# Patient Record
Sex: MALE | Race: WHITE | NOT HISPANIC OR LATINO | Employment: OTHER | URBAN - METROPOLITAN AREA
[De-identification: names, ages, dates, MRNs, and addresses within clinical notes are randomized per-mention and may not be internally consistent; named-entity substitution may affect disease eponyms.]

---

## 2017-02-20 ENCOUNTER — APPOINTMENT (EMERGENCY)
Dept: RADIOLOGY | Facility: HOSPITAL | Age: 57
End: 2017-02-20
Payer: OTHER GOVERNMENT

## 2017-02-20 ENCOUNTER — HOSPITAL ENCOUNTER (EMERGENCY)
Facility: HOSPITAL | Age: 57
Discharge: HOME | End: 2017-02-20
Attending: EMERGENCY MEDICINE | Admitting: EMERGENCY MEDICINE
Payer: OTHER GOVERNMENT

## 2017-02-20 VITALS
HEIGHT: 70 IN | OXYGEN SATURATION: 99 % | SYSTOLIC BLOOD PRESSURE: 161 MMHG | BODY MASS INDEX: 23.48 KG/M2 | RESPIRATION RATE: 18 BRPM | HEART RATE: 57 BPM | WEIGHT: 164 LBS | TEMPERATURE: 97.8 F | DIASTOLIC BLOOD PRESSURE: 89 MMHG

## 2017-02-20 DIAGNOSIS — L03.116 CELLULITIS OF LEFT LEG WITHOUT FOOT: Primary | ICD-10-CM

## 2017-02-20 PROCEDURE — 99283 EMERGENCY DEPT VISIT LOW MDM: CPT

## 2017-02-20 PROCEDURE — 73630 X-RAY EXAM OF FOOT: CPT

## 2017-02-20 PROCEDURE — 73610 X-RAY EXAM OF ANKLE: CPT

## 2017-02-20 RX ORDER — CEPHALEXIN 500 MG/1
500 CAPSULE ORAL 2 TIMES DAILY
Qty: 14 CAPSULE | Refills: 0 | Status: SHIPPED | OUTPATIENT
Start: 2017-02-20 | End: 2017-02-27

## 2017-02-20 RX ORDER — TRAZODONE HYDROCHLORIDE 50 MG/1
50 TABLET ORAL
COMMUNITY
End: 2020-06-05 | Stop reason: ALTCHOICE

## 2017-02-20 RX ORDER — TAMSULOSIN HYDROCHLORIDE 0.4 MG/1
0.4 CAPSULE ORAL
COMMUNITY
End: 2020-08-19 | Stop reason: SDUPTHER

## 2017-02-20 RX ORDER — CEPHALEXIN 250 MG/1
500 CAPSULE ORAL ONCE
Status: COMPLETED | OUTPATIENT
Start: 2017-02-20 | End: 2017-02-20

## 2017-02-20 RX ORDER — AMMONIUM LACTATE 12 G/100G
CREAM TOPICAL AS NEEDED
COMMUNITY

## 2017-02-20 RX ORDER — HYDROXYZINE HYDROCHLORIDE 10 MG/1
30 TABLET, FILM COATED ORAL DAILY PRN
COMMUNITY

## 2017-02-20 RX ORDER — DIPHENOXYLATE HYDROCHLORIDE AND ATROPINE SULFATE 2.5; .025 MG/1; MG/1
1 TABLET ORAL DAILY
COMMUNITY

## 2017-02-20 RX ORDER — LEVOTHYROXINE SODIUM 0.12 MG/1
112 TABLET ORAL DAILY
COMMUNITY

## 2017-02-20 RX ADMIN — CEPHALEXIN 500 MG: 250 CAPSULE ORAL at 13:01

## 2017-08-04 ENCOUNTER — HOSPITAL ENCOUNTER (EMERGENCY)
Facility: HOSPITAL | Age: 57
Discharge: HOME | End: 2017-08-04

## 2017-08-04 VITALS
RESPIRATION RATE: 18 BRPM | WEIGHT: 158 LBS | OXYGEN SATURATION: 95 % | BODY MASS INDEX: 22.62 KG/M2 | HEIGHT: 70 IN | TEMPERATURE: 97.3 F | DIASTOLIC BLOOD PRESSURE: 94 MMHG | SYSTOLIC BLOOD PRESSURE: 158 MMHG | HEART RATE: 52 BPM

## 2017-08-04 DIAGNOSIS — S61.211A LACERATION OF LEFT INDEX FINGER: Primary | ICD-10-CM

## 2017-08-04 PROCEDURE — 99282 EMERGENCY DEPT VISIT SF MDM: CPT

## 2017-08-04 RX ORDER — CEPHALEXIN 500 MG/1
500 CAPSULE ORAL 4 TIMES DAILY
Qty: 20 CAPSULE | Refills: 0 | Status: SHIPPED | OUTPATIENT
Start: 2017-08-04 | End: 2017-08-09

## 2017-08-04 RX ORDER — GINSENG 100 MG
1 CAPSULE ORAL ONCE
Status: COMPLETED | OUTPATIENT
Start: 2017-08-04 | End: 2017-08-04

## 2017-08-04 RX ADMIN — BACITRACIN 1 SMALL APPLICATION: 500 OINTMENT TOPICAL at 11:18

## 2019-12-30 ENCOUNTER — OFFICE VISIT (OUTPATIENT)
Dept: UROLOGY | Facility: HOSPITAL | Age: 59
End: 2019-12-30
Payer: MEDICARE

## 2019-12-30 ENCOUNTER — TELEPHONE (OUTPATIENT)
Dept: UROLOGY | Facility: HOSPITAL | Age: 59
End: 2019-12-30

## 2019-12-30 VITALS — HEIGHT: 70 IN | BODY MASS INDEX: 23.48 KG/M2 | WEIGHT: 164 LBS

## 2019-12-30 DIAGNOSIS — R39.198 DIFFICULTY URINATING: Primary | ICD-10-CM

## 2019-12-30 LAB — POST-VOID RESIDUAL VOLUME, ML POC: 6 ML

## 2019-12-30 PROCEDURE — 51798 US URINE CAPACITY MEASURE: CPT | Performed by: UROLOGY

## 2019-12-30 PROCEDURE — 99204 OFFICE O/P NEW MOD 45 MIN: CPT | Performed by: UROLOGY

## 2019-12-30 RX ORDER — VARENICLINE TARTRATE 1 MG/1
1 TABLET, FILM COATED ORAL 2 TIMES DAILY
COMMUNITY

## 2019-12-30 RX ORDER — CHLORAL HYDRATE 500 MG
1000 CAPSULE ORAL DAILY
COMMUNITY

## 2019-12-30 NOTE — PROGRESS NOTES
Assessment/Plan:    Difficulty urinating  It is difficult to discern whether the patient has an overactive bladder and is having difficulty urinating as his bladder is not very full or if he actually has an atonic bladder  We will proceed with urodynamics and cystoscopy for further evaluation  He is to continue on the tamsulosin and we will hold off on any further treatments until his workup is complete  Diagnoses and all orders for this visit:    Difficulty urinating  -     POCT Measure PVR    Other orders  -     Omega-3 Fatty Acids (FISH OIL) 1,000 mg; Take 1,000 mg by mouth daily  -     varenicline (CHANTIX) 1 mg tablet; Take 1 mg by mouth 2 (two) times a day          Total visit time was 60 minutes of which over 50% was spent on counseling  Subjective:     Patient ID: Matheus Collins is a 60 y o  male    50-year-old male with a history of traumatic brain injury presents from his facility with complaints of difficulty urinating  The patient's main complaint is that he has to sit to urinate and push on his abdomen with his hands to empty his bladder  He is a poor historian and is unable to exactly tell me how many times he urinates during the day  He denies any urinary urgency or incontinence  His lower urinary tract symptoms are detailed below  He states that he has to strain so hard to urinate that sometimes he defecates as well  He also has nocturia but states he urinates better at night  He is on tamsulosin and has been on this medication for a while  He has no other complaints  The following portions of the patient's history were reviewed and updated as appropriate: allergies, current medications, past family history, past medical history, past social history, past surgical history and problem list     Review of Systems   Constitutional: Negative  HENT: Negative  Eyes: Negative  Respiratory: Negative  Cardiovascular: Negative  Gastrointestinal: Negative  Endocrine: Negative  Genitourinary:        As noted per HPI   Musculoskeletal: Negative  Skin: Negative  Allergic/Immunologic: Negative  Neurological: Negative  Hematological: Negative  Psychiatric/Behavioral: Negative  AUA SYMPTOM SCORE      Most Recent Value   AUA SYMPTOM SCORE   How often have you had a sensation of not emptying your bladder completely after you finished urinating? 4   How often have you had to urinate again less than two hours after you finished urinating? 4   How often have you found you stopped and started again several times when you urinate?  0   How often have you found it difficult to postpone urination? 0   How often have you had a weak urinary stream?  5   How often have you had to push or strain to begin urination? 5   How many times did you most typically get up to urinate from the time you went to bed at night until the time you got up in the morning? 4   Quality of Life: If you were to spend the rest of your life with your urinary condition just the way it is now, how would you feel about that?  6   AUA SYMPTOM SCORE  22              Objective:    Physical Exam   Constitutional: He is oriented to person, place, and time  He appears well-developed and well-nourished  Neck: Normal range of motion  Cardiovascular: Intact distal pulses  Pulmonary/Chest: Effort normal    Abdominal: Soft  Bowel sounds are normal  He exhibits no distension and no mass  There is no tenderness  There is no rebound and no guarding  Genitourinary: Rectum normal and penis normal    Genitourinary Comments: Prostate 40 g, smooth, no nodules, nontender  Patient did have a little bit of urination during prostate exam    Musculoskeletal: Normal range of motion  Neurological: He is alert and oriented to person, place, and time  Skin: Skin is warm and dry  Psychiatric: He has a normal mood and affect  Vitals reviewed          Results  No results found for: PSA  No results found for: GLUCOSE, CALCIUM, NA, K, CO2, CL, BUN, CREATININE  No results found for: WBC, HGB, HCT, MCV, PLT    Recent Results (from the past 1 hour(s))   POCT Measure PVR    Collection Time: 12/30/19  3:42 PM   Result Value Ref Range    POST-VOID RESIDUAL VOLUME, ML POC 6 mL   ]

## 2019-12-30 NOTE — TELEPHONE ENCOUNTER
PT WAS SEEN IN THE OFFICE TODAY, PER DR JONES PT NEEDS AN APPT FOR A CYSTO/URODYNAMIC APPT SCHED IN [facility]

## 2019-12-30 NOTE — ASSESSMENT & PLAN NOTE
It is difficult to discern whether the patient has an overactive bladder and is having difficulty urinating as his bladder is not very full or if he actually has an atonic bladder  We will proceed with urodynamics and cystoscopy for further evaluation  He is to continue on the tamsulosin and we will hold off on any further treatments until his workup is complete

## 2020-01-02 NOTE — TELEPHONE ENCOUNTER
Patient with h/o Traumatic brain injury with complaints of difficulty urinating, straining, and nocturia  Ordered to have urodynamics and cystoscopy  He currently resides in rehab facility per chart  Will attempt to call and schedule urodynamics tomorrow

## 2020-01-03 NOTE — TELEPHONE ENCOUNTER
Called and spoke with patient's  Agustina Pal  He reports patient does consent for himself for invasive procedures  He also reports patient ambulatory and able to follow commands appropriately  Reviewed the need to schedule urodynamic testing, offered in the [place] office  Offered 1/8/19 and Agustina Pal deferred as there are conflicting appts at his facility  Next offered 2/12/19 for urodynamics at 10 am   This appt accepted and details confirmed  Encouraged the patient to present to the office with a comfortably full bladder  Next scheduled cysto FU and review of UDS with Dr Arthur Menon in the [facility] office on 2/28/19 at 10 am   Agustina Pal accepted and confirmed these appt details as well

## 2020-02-12 ENCOUNTER — PROCEDURE VISIT (OUTPATIENT)
Dept: UROLOGY | Facility: AMBULATORY SURGERY CENTER | Age: 60
End: 2020-02-12
Payer: MEDICARE

## 2020-02-12 DIAGNOSIS — N32.81 DETRUSOR INSTABILITY: ICD-10-CM

## 2020-02-12 DIAGNOSIS — N39.41 URGE INCONTINENCE OF URINE: Primary | ICD-10-CM

## 2020-02-12 DIAGNOSIS — R39.11 HESITANCY: ICD-10-CM

## 2020-02-12 LAB
SL AMB  POCT GLUCOSE, UA: NEGATIVE
SL AMB LEUKOCYTE ESTERASE,UA: NEGATIVE
SL AMB POCT BILIRUBIN,UA: NEGATIVE
SL AMB POCT BLOOD,UA: NEGATIVE
SL AMB POCT CLARITY,UA: CLEAR
SL AMB POCT COLOR,UA: YELLOW
SL AMB POCT KETONES,UA: NEGATIVE
SL AMB POCT NITRITE,UA: NEGATIVE
SL AMB POCT PH,UA: 5
SL AMB POCT SPECIFIC GRAVITY,UA: 1.02
SL AMB POCT URINE PROTEIN: NEGATIVE
SL AMB POCT UROBILINOGEN: NORMAL

## 2020-02-12 PROCEDURE — 81002 URINALYSIS NONAUTO W/O SCOPE: CPT

## 2020-02-12 PROCEDURE — 51728 CYSTOMETROGRAM W/VP: CPT

## 2020-02-12 PROCEDURE — 51784 ANAL/URINARY MUSCLE STUDY: CPT

## 2020-02-12 PROCEDURE — 51797 INTRAABDOMINAL PRESSURE TEST: CPT

## 2020-02-12 NOTE — PROGRESS NOTES
CC: "trouble emptying"    Unable to void 110 ml in bladder    CMG:       Position:      Standing       Fill sensation   21 ml,  pdet 0 cm of H2O       First urge:          201 ml,     pdet 3 cm of H2O          Normal urge:     219 ml,     pdet -4 cm of H2O       Must urge:       Not verbalized        Capacity:          287  ml,     pdet 43 cm of H2O          Max pdet during void    47 cm H2O       Voided volume:    102 6 ml       Bladder stability:    unstable at  287ml with  leakage       Compliance:  normal        EMG activity:       Normal during filling,        normal during voiding    Comments:   Sensory urgency   Instructed to void at normal urge (219 ml) and voided 115 ml with pdet of 54 cm of H2O   Fill then restarted and at 287 ml had DI, leaked and voided with max pdet of 47 cm of H2O   Voided with slow flow   Calculated residual 169 ml    Urodynamics  Date/Time: 2/12/2020 4:59 PM  Performed by: Kalen Esquivel RN  Authorized by: Jo Ann Wright MD   Procedure - Urodynamics:  Procedure details: CMG      Voiding Pressure Study: Yes    Intra-abdominal Voiding Pressure Study: Yes    Post-procedure:     Patient tolerance: Patient tolerated procedure well with no immediate complications

## 2020-02-28 ENCOUNTER — PROCEDURE VISIT (OUTPATIENT)
Dept: UROLOGY | Facility: HOSPITAL | Age: 60
End: 2020-02-28
Payer: MEDICARE

## 2020-02-28 VITALS
HEART RATE: 64 BPM | SYSTOLIC BLOOD PRESSURE: 140 MMHG | HEIGHT: 70 IN | WEIGHT: 161.8 LBS | BODY MASS INDEX: 23.16 KG/M2 | DIASTOLIC BLOOD PRESSURE: 90 MMHG

## 2020-02-28 DIAGNOSIS — N39.41 URGE INCONTINENCE: Primary | ICD-10-CM

## 2020-02-28 PROCEDURE — 99213 OFFICE O/P EST LOW 20 MIN: CPT | Performed by: UROLOGY

## 2020-02-28 PROCEDURE — 52000 CYSTOURETHROSCOPY: CPT | Performed by: UROLOGY

## 2020-02-28 RX ORDER — OXYBUTYNIN CHLORIDE 10 MG/1
10 TABLET, EXTENDED RELEASE ORAL DAILY
Qty: 30 TABLET | Refills: 1 | Status: SHIPPED | OUTPATIENT
Start: 2020-02-28 | End: 2020-05-13 | Stop reason: SDUPTHER

## 2020-02-28 NOTE — PROGRESS NOTES
Cystoscopy  Date/Time: 2/28/2020 10:42 AM  Performed by: Saúl Serrano MD  Authorized by: Saúl Serrano MD     Procedure details: cystoscopy          Written Consent Obtained      Indications for Procedure:   refractory lower urinary tract symptoms     Physical Exam     Constitutional   General appearance: No acute distress, well appearing and well nourished  Pulmonary   Respiratory effort: No increased work of breathing or signs of respiratory distress  Cardiovascular   Examination of extremities for edema and/or varicosities: Normal     Abdomen   Abdomen: Non-tender, no masses  Liver and spleen: No hepatomegaly or splenomegaly  Genitourinary   Normal phallus and meatus   Musculoskeletal   Gait and station: Normal     Skin   Skin and subcutaneous tissue: Normal without rashes or lesions  Lymphatic   Palpation of lymph nodes in groin: No lymphadenopathy  Additional Exam: Neuro exam nonfocal   The flexible cystoscope was introduced into the urethra and advanced into the bladder  URETHRA:  Normal,  without strictures or lesions  PROSTATE:  Moderate bilobar obstruction  TRIGONE & UOs:   Normal anatomy with efflux of clear urine  No mucosal lesions or subtrigonal masses  BLADDER MUCOSA:  Normal, without neoplasms or other lesions  DETRUSOR: Normal capacity, without flaccidity, without excessive compliance, without trabeculation or diverticula, without uninhibited bladder contractions on fillings  RETROFLEXED SCOPE VIEW: Normal bladder neck    Plan:  I reviewed his urodynamics and cystoscopy results with him  It seems like he has an overactive bladder and likely has difficulty urinating as his bladder is not very full when he has the urge to urinate  He is to continue on tamsulosin and we will add oxybutynin  Side effects were discussed    He will follow up in 6 weeks to re-evaluate his symptoms and check a postvoid residual   In addition to the cystoscopy today I spent over 15 minutes discussing his urodynamics results and treatment options

## 2020-04-24 ENCOUNTER — TELEPHONE (OUTPATIENT)
Dept: UROLOGY | Facility: MEDICAL CENTER | Age: 60
End: 2020-04-24

## 2020-04-29 ENCOUNTER — TELEPHONE (OUTPATIENT)
Dept: UROLOGY | Facility: AMBULATORY SURGERY CENTER | Age: 60
End: 2020-04-29

## 2020-05-13 ENCOUNTER — TELEPHONE (OUTPATIENT)
Dept: UROLOGY | Facility: AMBULATORY SURGERY CENTER | Age: 60
End: 2020-05-13

## 2020-05-13 ENCOUNTER — TELEMEDICINE (OUTPATIENT)
Dept: UROLOGY | Facility: AMBULATORY SURGERY CENTER | Age: 60
End: 2020-05-13
Payer: MEDICARE

## 2020-05-13 DIAGNOSIS — Z12.5 SCREENING FOR PROSTATE CANCER: ICD-10-CM

## 2020-05-13 DIAGNOSIS — N39.41 URGE INCONTINENCE: ICD-10-CM

## 2020-05-13 DIAGNOSIS — R39.198 DIFFICULTY URINATING: Primary | ICD-10-CM

## 2020-05-13 DIAGNOSIS — N40.1 BENIGN PROSTATIC HYPERPLASIA WITH LOWER URINARY TRACT SYMPTOMS, SYMPTOM DETAILS UNSPECIFIED: ICD-10-CM

## 2020-05-13 PROCEDURE — 99442 PR PHYS/QHP TELEPHONE EVALUATION 11-20 MIN: CPT | Performed by: NURSE PRACTITIONER

## 2020-05-13 RX ORDER — OXYBUTYNIN CHLORIDE 15 MG/1
15 TABLET, EXTENDED RELEASE ORAL DAILY
Qty: 30 TABLET | Refills: 4 | Status: SHIPPED | OUTPATIENT
Start: 2020-05-13 | End: 2020-08-19 | Stop reason: ALTCHOICE

## 2020-05-13 RX ORDER — OXYBUTYNIN CHLORIDE 15 MG/1
15 TABLET, EXTENDED RELEASE ORAL DAILY
Qty: 30 TABLET | Refills: 4 | Status: SHIPPED | OUTPATIENT
Start: 2020-05-13 | End: 2020-05-13 | Stop reason: SDUPTHER

## 2020-06-02 ENCOUNTER — TELEPHONE (OUTPATIENT)
Dept: UROLOGY | Facility: MEDICAL CENTER | Age: 60
End: 2020-06-02

## 2020-06-05 ENCOUNTER — HOSPITAL ENCOUNTER (EMERGENCY)
Facility: HOSPITAL | Age: 60
Discharge: HOME | End: 2020-06-05
Attending: EMERGENCY MEDICINE
Payer: COMMERCIAL

## 2020-06-05 VITALS
BODY MASS INDEX: 23.1 KG/M2 | TEMPERATURE: 97.2 F | WEIGHT: 161 LBS | DIASTOLIC BLOOD PRESSURE: 93 MMHG | SYSTOLIC BLOOD PRESSURE: 149 MMHG | RESPIRATION RATE: 15 BRPM | OXYGEN SATURATION: 99 % | HEART RATE: 59 BPM

## 2020-06-05 DIAGNOSIS — L03.116 CELLULITIS OF LEFT LOWER LEG: Primary | ICD-10-CM

## 2020-06-05 DIAGNOSIS — T50.905A DRUG SIDE EFFECTS, INITIAL ENCOUNTER: ICD-10-CM

## 2020-06-05 LAB
ALBUMIN SERPL BCP-MCNC: 4.3 G/DL (ref 3.5–5)
ALP SERPL-CCNC: 108 U/L (ref 46–116)
ALT SERPL W P-5'-P-CCNC: 21 U/L (ref 12–78)
ANION GAP SERPL CALCULATED.3IONS-SCNC: 6 MMOL/L (ref 4–13)
APTT PPP: 39 SECONDS (ref 23–37)
AST SERPL W P-5'-P-CCNC: 25 U/L (ref 5–45)
BASOPHILS # BLD AUTO: 0.06 THOUSANDS/ΜL (ref 0–0.1)
BASOPHILS NFR BLD AUTO: 0 % (ref 0–1)
BILIRUB SERPL-MCNC: 0.6 MG/DL (ref 0.2–1)
BILIRUB UR QL STRIP: NEGATIVE
BUN SERPL-MCNC: 19 MG/DL (ref 5–25)
CALCIUM SERPL-MCNC: 9.2 MG/DL (ref 8.3–10.1)
CHLORIDE SERPL-SCNC: 101 MMOL/L (ref 100–108)
CLARITY UR: CLEAR
CO2 SERPL-SCNC: 31 MMOL/L (ref 21–32)
COLOR UR: YELLOW
CREAT SERPL-MCNC: 1.26 MG/DL (ref 0.6–1.3)
D DIMER PPP FEU-MCNC: 0.36 UG/ML FEU
EOSINOPHIL # BLD AUTO: 0.48 THOUSAND/ΜL (ref 0–0.61)
EOSINOPHIL NFR BLD AUTO: 3 % (ref 0–6)
ERYTHROCYTE [DISTWIDTH] IN BLOOD BY AUTOMATED COUNT: 19.8 % (ref 11.6–15.1)
GFR SERPL CREATININE-BSD FRML MDRD: 61 ML/MIN/1.73SQ M
GLUCOSE SERPL-MCNC: 98 MG/DL (ref 65–140)
GLUCOSE UR STRIP-MCNC: NEGATIVE MG/DL
HCT VFR BLD AUTO: 48.9 % (ref 36.5–49.3)
HGB BLD-MCNC: 15.3 G/DL (ref 12–17)
HGB UR QL STRIP.AUTO: NEGATIVE
IMM GRANULOCYTES # BLD AUTO: 0.09 THOUSAND/UL (ref 0–0.2)
IMM GRANULOCYTES NFR BLD AUTO: 1 % (ref 0–2)
INR PPP: 1.24 (ref 0.84–1.19)
KETONES UR STRIP-MCNC: NEGATIVE MG/DL
LEUKOCYTE ESTERASE UR QL STRIP: NEGATIVE
LYMPHOCYTES # BLD AUTO: 1.68 THOUSANDS/ΜL (ref 0.6–4.47)
LYMPHOCYTES NFR BLD AUTO: 11 % (ref 14–44)
MCH RBC QN AUTO: 19 PG (ref 26.8–34.3)
MCHC RBC AUTO-ENTMCNC: 31.3 G/DL (ref 31.4–37.4)
MCV RBC AUTO: 61 FL (ref 82–98)
MONOCYTES # BLD AUTO: 0.72 THOUSAND/ΜL (ref 0.17–1.22)
MONOCYTES NFR BLD AUTO: 5 % (ref 4–12)
NEUTROPHILS # BLD AUTO: 12.81 THOUSANDS/ΜL (ref 1.85–7.62)
NEUTS SEG NFR BLD AUTO: 80 % (ref 43–75)
NITRITE UR QL STRIP: NEGATIVE
NRBC BLD AUTO-RTO: 0 /100 WBCS
PH UR STRIP.AUTO: 6 [PH]
PLATELET # BLD AUTO: 291 THOUSANDS/UL (ref 149–390)
POTASSIUM SERPL-SCNC: 4.5 MMOL/L (ref 3.5–5.3)
PROT SERPL-MCNC: 8.4 G/DL (ref 6.4–8.2)
PROT UR STRIP-MCNC: NEGATIVE MG/DL
PROTHROMBIN TIME: 15.3 SECONDS (ref 11.6–14.5)
RBC # BLD AUTO: 8.07 MILLION/UL (ref 3.88–5.62)
SODIUM SERPL-SCNC: 138 MMOL/L (ref 136–145)
SP GR UR STRIP.AUTO: 1.02 (ref 1–1.03)
UROBILINOGEN UR QL STRIP.AUTO: 0.2 E.U./DL
WBC # BLD AUTO: 15.84 THOUSAND/UL (ref 4.31–10.16)

## 2020-06-05 PROCEDURE — 99283 EMERGENCY DEPT VISIT LOW MDM: CPT

## 2020-06-05 PROCEDURE — 85730 THROMBOPLASTIN TIME PARTIAL: CPT | Performed by: EMERGENCY MEDICINE

## 2020-06-05 PROCEDURE — 85379 FIBRIN DEGRADATION QUANT: CPT | Performed by: EMERGENCY MEDICINE

## 2020-06-05 PROCEDURE — 99284 EMERGENCY DEPT VISIT MOD MDM: CPT | Performed by: EMERGENCY MEDICINE

## 2020-06-05 PROCEDURE — 85025 COMPLETE CBC W/AUTO DIFF WBC: CPT | Performed by: EMERGENCY MEDICINE

## 2020-06-05 PROCEDURE — 80053 COMPREHEN METABOLIC PANEL: CPT | Performed by: EMERGENCY MEDICINE

## 2020-06-05 PROCEDURE — 85610 PROTHROMBIN TIME: CPT | Performed by: EMERGENCY MEDICINE

## 2020-06-05 PROCEDURE — 81003 URINALYSIS AUTO W/O SCOPE: CPT | Performed by: EMERGENCY MEDICINE

## 2020-06-05 PROCEDURE — 36415 COLL VENOUS BLD VENIPUNCTURE: CPT | Performed by: EMERGENCY MEDICINE

## 2020-06-05 RX ORDER — CEPHALEXIN 250 MG/1
500 CAPSULE ORAL ONCE
Status: COMPLETED | OUTPATIENT
Start: 2020-06-05 | End: 2020-06-05

## 2020-06-05 RX ORDER — IBUPROFEN 400 MG/1
400 TABLET ORAL ONCE
Status: COMPLETED | OUTPATIENT
Start: 2020-06-05 | End: 2020-06-05

## 2020-06-05 RX ORDER — LOPERAMIDE HYDROCHLORIDE 2 MG/1
2 CAPSULE ORAL 2 TIMES DAILY PRN
COMMUNITY

## 2020-06-05 RX ORDER — TOLNAFTATE 1 G/100G
1 POWDER TOPICAL 2 TIMES DAILY
COMMUNITY

## 2020-06-05 RX ORDER — RANITIDINE 150 MG/1
150 TABLET ORAL DAILY PRN
COMMUNITY

## 2020-06-05 RX ORDER — CEPHALEXIN 500 MG/1
500 CAPSULE ORAL EVERY 6 HOURS SCHEDULED
Qty: 20 CAPSULE | Refills: 0 | Status: SHIPPED | OUTPATIENT
Start: 2020-06-05 | End: 2020-06-10

## 2020-06-05 RX ORDER — CLOTRIMAZOLE 1 G/ML
1 SOLUTION TOPICAL 2 TIMES DAILY
COMMUNITY

## 2020-06-05 RX ORDER — CHLORHEXIDINE GLUCONATE 0.12 MG/ML
15 RINSE ORAL 2 TIMES DAILY
COMMUNITY

## 2020-06-05 RX ADMIN — CEPHALEXIN 500 MG: 250 CAPSULE ORAL at 21:24

## 2020-06-05 RX ADMIN — IBUPROFEN 400 MG: 400 TABLET ORAL at 21:25

## 2020-07-24 ENCOUNTER — HOSPITAL ENCOUNTER (EMERGENCY)
Facility: HOSPITAL | Age: 60
Discharge: HOME | End: 2020-07-25
Attending: EMERGENCY MEDICINE | Admitting: EMERGENCY MEDICINE
Payer: COMMERCIAL

## 2020-07-24 DIAGNOSIS — M25.473 ANKLE SWELLING: Primary | ICD-10-CM

## 2020-07-24 DIAGNOSIS — R71.8 ELEVATED RED BLOOD CELL COUNT: ICD-10-CM

## 2020-07-24 PROCEDURE — 99285 EMERGENCY DEPT VISIT HI MDM: CPT | Performed by: EMERGENCY MEDICINE

## 2020-07-24 PROCEDURE — 99285 EMERGENCY DEPT VISIT HI MDM: CPT

## 2020-07-24 RX ORDER — IBUPROFEN 600 MG/1
600 TABLET ORAL ONCE
Status: COMPLETED | OUTPATIENT
Start: 2020-07-24 | End: 2020-07-25

## 2020-07-24 RX ORDER — ACETAMINOPHEN 325 MG/1
975 TABLET ORAL ONCE
Status: COMPLETED | OUTPATIENT
Start: 2020-07-24 | End: 2020-07-25

## 2020-07-25 ENCOUNTER — APPOINTMENT (EMERGENCY)
Dept: RADIOLOGY | Facility: HOSPITAL | Age: 60
End: 2020-07-25
Payer: COMMERCIAL

## 2020-07-25 VITALS
BODY MASS INDEX: 23.05 KG/M2 | OXYGEN SATURATION: 98 % | TEMPERATURE: 98.1 F | SYSTOLIC BLOOD PRESSURE: 185 MMHG | DIASTOLIC BLOOD PRESSURE: 103 MMHG | HEIGHT: 70 IN | WEIGHT: 161 LBS | RESPIRATION RATE: 20 BRPM | HEART RATE: 63 BPM

## 2020-07-25 LAB
ANION GAP SERPL CALCULATED.3IONS-SCNC: 10 MMOL/L (ref 4–13)
APTT PPP: 41 SECONDS (ref 23–37)
BASOPHILS # BLD AUTO: 0.06 THOUSANDS/ΜL (ref 0–0.1)
BASOPHILS NFR BLD AUTO: 1 % (ref 0–1)
BUN SERPL-MCNC: 13 MG/DL (ref 5–25)
CALCIUM SERPL-MCNC: 9 MG/DL (ref 8.3–10.1)
CHLORIDE SERPL-SCNC: 102 MMOL/L (ref 100–108)
CO2 SERPL-SCNC: 26 MMOL/L (ref 21–32)
CREAT SERPL-MCNC: 1.13 MG/DL (ref 0.6–1.3)
D DIMER PPP FEU-MCNC: <0.27 UG/ML FEU
EOSINOPHIL # BLD AUTO: 0.19 THOUSAND/ΜL (ref 0–0.61)
EOSINOPHIL NFR BLD AUTO: 3 % (ref 0–6)
ERYTHROCYTE [DISTWIDTH] IN BLOOD BY AUTOMATED COUNT: 22.8 % (ref 11.6–15.1)
GFR SERPL CREATININE-BSD FRML MDRD: 70 ML/MIN/1.73SQ M
GLUCOSE SERPL-MCNC: 102 MG/DL (ref 65–140)
HCT VFR BLD AUTO: 52.8 % (ref 36.5–49.3)
HGB BLD-MCNC: 16.6 G/DL (ref 12–17)
IMM GRANULOCYTES # BLD AUTO: 0.03 THOUSAND/UL (ref 0–0.2)
IMM GRANULOCYTES NFR BLD AUTO: 0 % (ref 0–2)
INR PPP: 1.27 (ref 0.84–1.19)
LYMPHOCYTES # BLD AUTO: 0.78 THOUSANDS/ΜL (ref 0.6–4.47)
LYMPHOCYTES NFR BLD AUTO: 12 % (ref 14–44)
MCH RBC QN AUTO: 19.3 PG (ref 26.8–34.3)
MCHC RBC AUTO-ENTMCNC: 31.4 G/DL (ref 31.4–37.4)
MCV RBC AUTO: 61 FL (ref 82–98)
MONOCYTES # BLD AUTO: 0.34 THOUSAND/ΜL (ref 0.17–1.22)
MONOCYTES NFR BLD AUTO: 5 % (ref 4–12)
NEUTROPHILS # BLD AUTO: 5.29 THOUSANDS/ΜL (ref 1.85–7.62)
NEUTS SEG NFR BLD AUTO: 79 % (ref 43–75)
NRBC BLD AUTO-RTO: 0 /100 WBCS
NT-PROBNP SERPL-MCNC: 124 PG/ML
PLATELET # BLD AUTO: 166 THOUSANDS/UL (ref 149–390)
POTASSIUM SERPL-SCNC: 4 MMOL/L (ref 3.5–5.3)
PROTHROMBIN TIME: 15.6 SECONDS (ref 11.6–14.5)
RBC # BLD AUTO: 8.62 MILLION/UL (ref 3.88–5.62)
SODIUM SERPL-SCNC: 138 MMOL/L (ref 136–145)
WBC # BLD AUTO: 13.38 THOUSAND/UL (ref 4.31–10.16)

## 2020-07-25 PROCEDURE — 96361 HYDRATE IV INFUSION ADD-ON: CPT

## 2020-07-25 PROCEDURE — 83880 ASSAY OF NATRIURETIC PEPTIDE: CPT | Performed by: EMERGENCY MEDICINE

## 2020-07-25 PROCEDURE — 80048 BASIC METABOLIC PNL TOTAL CA: CPT | Performed by: EMERGENCY MEDICINE

## 2020-07-25 PROCEDURE — 73600 X-RAY EXAM OF ANKLE: CPT

## 2020-07-25 PROCEDURE — 85025 COMPLETE CBC W/AUTO DIFF WBC: CPT | Performed by: EMERGENCY MEDICINE

## 2020-07-25 PROCEDURE — 85610 PROTHROMBIN TIME: CPT | Performed by: EMERGENCY MEDICINE

## 2020-07-25 PROCEDURE — 96360 HYDRATION IV INFUSION INIT: CPT

## 2020-07-25 PROCEDURE — 85379 FIBRIN DEGRADATION QUANT: CPT | Performed by: EMERGENCY MEDICINE

## 2020-07-25 PROCEDURE — 85730 THROMBOPLASTIN TIME PARTIAL: CPT | Performed by: EMERGENCY MEDICINE

## 2020-07-25 PROCEDURE — 36415 COLL VENOUS BLD VENIPUNCTURE: CPT | Performed by: EMERGENCY MEDICINE

## 2020-07-25 RX ORDER — HYDROXYUREA 500 MG/1
500 CAPSULE ORAL DAILY
COMMUNITY

## 2020-07-25 RX ORDER — LANOLIN ALCOHOL/MO/W.PET/CERES
1000 CREAM (GRAM) TOPICAL DAILY
COMMUNITY

## 2020-07-25 RX ADMIN — ACETAMINOPHEN 975 MG: 325 TABLET ORAL at 00:15

## 2020-07-25 RX ADMIN — IBUPROFEN 600 MG: 600 TABLET, FILM COATED ORAL at 00:15

## 2020-07-25 RX ADMIN — SODIUM CHLORIDE 2000 ML: 0.9 INJECTION, SOLUTION INTRAVENOUS at 03:15

## 2020-07-25 NOTE — DISCHARGE INSTRUCTIONS
Your blood cell count was elevated today's visit, please follow-up with your primary care provider for a recheck      Please follow-up with the primary care provider for recheck of your bilateral ankle swelling, if your symptoms worsen including worsening pain, redness, numbness or tingling of the feet please return to the emergency department

## 2020-07-25 NOTE — ED PROVIDER NOTES
History  Chief Complaint   Patient presents with    Ankle Swelling     Bilateral ankle swelling since 7/23/2020  History of cellulitis  71-year-old male past medical history of TBI presents for evaluation of bilateral ankle swelling times 1 day he also states that his ankles bother him, worse when he is walking better when he is laying down  Denies any specific injury  Patient states he has also been having a burning sensation in his legs for which he tried taking ibuprofen, 200 mg at 09:30 today without relief  Patient denies any chest pain, denies any shortness of breath denies any previous history of DVT or PE, no history of CHF  Patient has been treated for cellulitis of lower extremities in the past   Patient is a poor historian secondary to his TBI  On chart review it appears the patient was seen 1 month ago for bilateral lower extremity swelling and ankle discomfort, at that time workup was negative including a negative D-dimer  Prior to Admission Medications   Prescriptions Last Dose Informant Patient Reported? Taking?    AMLODIPINE BESYLATE PO  Self Yes Yes   Sig: Take 2 5 mg by mouth daily   Cetirizine HCl (ZYRTEC ALLERGY) 10 MG CAPS  Outside Facility (Specify) Yes No   Sig: Take 10 mg by mouth daily    Omega-3 Fatty Acids (FISH OIL) 1,000 mg  Outside Facility (Specify) Yes No   Sig: Take 1,000 mg by mouth daily   ammonium lactate (LAC-HYDRIN) 12 % cream  Outside Facility (Specify) Yes No   Sig: Apply topically as needed for dry skin   aspirin 81 MG tablet  Outside Facility (Specify) Yes No   Sig: Take 81 mg by mouth daily   chlorhexidine (PERIDEX) 0 12 % solution   Yes No   Sig: Apply 15 mL to the mouth or throat 2 (two) times a day   clotrimazole 1 % external solution   Yes No   Sig: Apply 1 application topically 2 (two) times a day   fluticasone (FLOVENT DISKUS) 50 MCG/BLIST diskus inhaler Unknown at Unknown time Outside Facility (Specify) Yes No   Sig: Inhale 1 puff 2 (two) times a day   hydrOXYzine HCL (ATARAX) 10 mg tablet  Outside Facility (Specify) Yes No   Sig: Take 30 mg by mouth daily as needed    hydrocortisone 2 5 % cream  Self Yes Yes   Sig: Apply 1 application topically 4 (four) times a day as needed   hydroxyurea (HYDREA) 500 mg capsule  Self Yes Yes   Sig: Take 500 mg by mouth daily   ibuprofen (ADVIL,MOTRIN) 100 MG tablet Unknown at Unknown time Outside Facility (Specify) Yes No   Sig: Take 800 mg by mouth every 8 (eight) hours as needed for mild pain    levothyroxine 125 mcg tablet  Outside Facility (Specify) Yes No   Sig: Take 112 mcg by mouth daily    loperamide (IMODIUM) 2 mg capsule Unknown at Unknown time  Yes No   Sig: Take 2 mg by mouth 2 (two) times a day as needed for diarrhea   multivitamin (THERAGRAN) TABS  Outside Facility (Specify) Yes No   Sig: Take 1 tablet by mouth daily   oxybutynin (DITROPAN XL) 15 MG 24 hr tablet   No No   Sig: Take 1 tablet (15 mg total) by mouth daily   ranitidine (ZANTAC) 150 mg tablet Unknown at Unknown time  Yes No   Sig: Take 150 mg by mouth daily as needed for heartburn   tamsulosin (FLOMAX) 0 4 mg  Outside Facility (Specify) Yes No   Sig: Take 0 4 mg by mouth daily with dinner   tolnaftate (TINACTIN) 1 % powder   Yes No   Sig: Apply 1 application topically 2 (two) times a day   urea (CARMOL) 20 % cream  Self Yes Yes   Sig: Apply 1 application topically daily   varenicline (CHANTIX) 1 mg tablet  Outside Facility (Specify) Yes No   Sig: Take 1 mg by mouth 2 (two) times a day   vitamin B-12 (VITAMIN B-12) 1,000 mcg tablet  Self Yes Yes   Sig: Take 1,000 mcg by mouth daily      Facility-Administered Medications: None       Past Medical History:   Diagnosis Date    Hypothyroid     Traumatic brain injury [facility])        Past Surgical History:   Procedure Laterality Date    ABDOMINAL SURGERY      does not know what surgery was done       History reviewed  No pertinent family history    I have reviewed and agree with the history as documented  E-Cigarette/Vaping    E-Cigarette Use Never User      E-Cigarette/Vaping Substances    Nicotine No     THC No     CBD No     Flavoring No     Other No     Unknown No      Social History     Tobacco Use    Smoking status: Former Smoker    Smokeless tobacco: Never Used   Substance Use Topics    Alcohol use: No    Drug use: No       Review of Systems   Constitutional: Negative for appetite change, chills and fever  HENT: Negative for rhinorrhea and sore throat  Eyes: Negative for photophobia and visual disturbance  Respiratory: Negative for cough and shortness of breath  Cardiovascular: Negative for chest pain and palpitations  Gastrointestinal: Negative for abdominal pain and diarrhea  Genitourinary: Negative for dysuria, frequency and urgency  Musculoskeletal:        Ankle pain swelling   Skin: Negative for rash  Neurological: Negative for dizziness and weakness  All other systems reviewed and are negative  Physical Exam  Physical Exam   Constitutional: He is oriented to person, place, and time  He appears well-developed and well-nourished  HENT:   Head: Normocephalic and atraumatic  Right Ear: External ear normal    Left Ear: External ear normal    Mouth/Throat: Oropharynx is clear and moist    Eyes: Pupils are equal, round, and reactive to light  Conjunctivae and EOM are normal    Neck: Normal range of motion  Neck supple  No JVD present  No tracheal deviation present  Cardiovascular: Normal rate, regular rhythm and normal heart sounds  Exam reveals no gallop and no friction rub  No murmur heard  Pulmonary/Chest: Effort normal  No stridor  No respiratory distress  He has no wheezes  He has no rales  Abdominal: Soft  He exhibits no distension and no mass  There is no tenderness  There is no rebound and no guarding  Musculoskeletal: Normal range of motion  He exhibits no edema     Swelling to bilateral ankles, right greater than left, no erythema, distally with palpable DP, good perfusion bilaterally, no calf swelling no calf tenderness    Full intact range of motion   Neurological: He is alert and oriented to person, place, and time  No cranial nerve deficit  Skin: Skin is warm and dry  No rash noted  No erythema  No pallor  Psychiatric: Cognition and memory are impaired  He exhibits abnormal recent memory and abnormal remote memory  He is inattentive  Nursing note and vitals reviewed        Vital Signs  ED Triage Vitals   Temperature Pulse Respirations Blood Pressure SpO2   07/25/20 0017 07/24/20 2319 07/24/20 2319 07/24/20 2319 07/24/20 2319   98 1 °F (36 7 °C) 63 20 (!) 185/103 98 %      Temp Source Heart Rate Source Patient Position - Orthostatic VS BP Location FiO2 (%)   07/25/20 0017 07/24/20 2319 07/24/20 2319 07/24/20 2319 --   Temporal Monitor Lying Right arm       Pain Score       07/24/20 2319       7           Vitals:    07/24/20 2319   BP: (!) 185/103   Pulse: 63   Patient Position - Orthostatic VS: Lying         Visual Acuity      ED Medications  Medications   acetaminophen (TYLENOL) tablet 975 mg (975 mg Oral Given 7/25/20 0015)   ibuprofen (MOTRIN) tablet 600 mg (600 mg Oral Given 7/25/20 0015)   sodium chloride 0 9 % bolus 2,000 mL (0 mL Intravenous Stopped 7/25/20 0534)       Diagnostic Studies  Results Reviewed     Procedure Component Value Units Date/Time    CBC and differential [[id number]]  (Abnormal) Collected:  07/25/20 0000    Lab Status:  Edited Result - FINAL Specimen:  Blood from Hand, Left Updated:  07/25/20 0253     WBC 13 38 Thousand/uL      RBC 8 62 Million/uL      Hemoglobin 16 6 g/dL      Hematocrit 52 8 %      MCV 61 fL      MCH 19 3 pg      MCHC 31 4 g/dL      RDW 22 8 %      Platelets 147 Thousands/uL      nRBC 0 /100 WBCs      Neutrophils Relative 79 %      Immat GRANS % 0 %      Lymphocytes Relative 12 %      Monocytes Relative 5 %      Eosinophils Relative 3 %      Basophils Relative 1 %      Neutrophils Absolute 5 29 Thousands/µL      Immature Grans Absolute 0 03 Thousand/uL      Lymphocytes Absolute 0 78 Thousands/µL      Monocytes Absolute 0 34 Thousand/µL      Eosinophils Absolute 0 19 Thousand/µL      Basophils Absolute 0 06 Thousands/µL     Narrative:        CBC verified by x 2 dilution for RBC above instrument linearity-[facility] No Clots    Basic metabolic panel [[id number]] Collected:  07/25/20 0000    Lab Status:  Final result Specimen:  Blood from Hand, Left Updated:  07/25/20 0101     Sodium 138 mmol/L      Potassium 4 0 mmol/L      Chloride 102 mmol/L      CO2 26 mmol/L      ANION GAP 10 mmol/L      BUN 13 mg/dL      Creatinine 1 13 mg/dL      Glucose 102 mg/dL      Calcium 9 0 mg/dL      eGFR 70 ml/min/1 73sq m     Narrative:       [place] guidelines for Chronic Kidney Disease (CKD):     Stage 1 with normal or high GFR (GFR > 90 mL/min/1 73 square meters)    Stage 2 Mild CKD (GFR = 60-89 mL/min/1 73 square meters)    Stage 3A Moderate CKD (GFR = 45-59 mL/min/1 73 square meters)    Stage 3B Moderate CKD (GFR = 30-44 mL/min/1 73 square meters)    Stage 4 Severe CKD (GFR = 15-29 mL/min/1 73 square meters)    Stage 5 End Stage CKD (GFR <15 mL/min/1 73 square meters)  Note: GFR calculation is accurate only with a steady state creatinine    NT-BNP PRO [[id number]]  (Normal) Collected:  07/25/20 0000    Lab Status:  Final result Specimen:  Blood from Hand, Left Updated:  07/25/20 0101     NT-proBNP 124 pg/mL     Protime-INR [[id number]]  (Abnormal) Collected:  07/25/20 0001    Lab Status:  Final result Specimen:  Blood from Hand, Left Updated:  07/25/20 0058     Protime 15 6 seconds      INR 1 27    APTT [[id number]]  (Abnormal) Collected:  07/25/20 0001    Lab Status:  Final result Specimen:  Blood from Hand, Left Updated:  07/25/20 0058     PTT 41 seconds     D-dimer, quantitative [[id number]]  (Normal) Collected:  07/25/20 0001    Lab Status:  Final result Specimen:  Blood from Hand, Left Updated:  07/25/20 0054     D-Dimer, Quant <0 27 ug/ml FEU     APTT [[id number]]     Lab Status:  No result Specimen:  Blood     Protime-INR [[id number]]     Lab Status:  No result Specimen:  Blood                  XR ankle 2 views RIGHT   ED Interpretation by Ramon Quinonez MD (07/25 0121)   This study was ordered and independently reviewed by me      No acute findings noted                  Procedures  Procedures         ED Course  ED Course as of Jul 25 0538   Sat Jul 25, 2020   0249 Patient's red blood cell count elevated, has been elevated previously, patient states that he has not been drinking many fluids because of his prostate issues that cause him to have issues starting his stream, he is currently under the care of Urology and will follow up with them for further care  Able to urinate in the emergency department  His dimer is negative, x-rays negative for fracture, no obvious cellulitis or joint effusion, neurovascularly intact  Will have patient follow up with PCP for further care                                                MDM  Number of Diagnoses or Management Options  Diagnosis management comments: 67-year-old male with past medical history of TBI presents for evaluation of bilateral ankle swelling and pain  There is minimal swelling of the left lower extremity isolated to the ankle, the lower extremities otherwise neurovascularly intact with no calf tenderness or swelling    Right ankle somewhat more swollen than the left again distally neurovascularly intact with full intact sensation, normal capillary refill, DPs palpable    Low suspicion for DVT PE at this time, patient was seen for similar presentation 1 month ago at that time had a negative workup including negative D-dimer, however patient is a poor historian and now complaining of right ankle swelling versus left ankle swelling will check blood work given patient's complaint of cramp like burning sensation bilateral lower extremities earlier today  Will treat symptomatically and re-evaluate        Disposition  Final diagnoses: Ankle swelling   Elevated red blood cell count     Time reflects when diagnosis was documented in both MDM as applicable and the Disposition within this note     Time User Action Codes Description Comment    7/25/2020  3:36 AM Charlie Ball Add [M25 473] Ankle swelling     7/25/2020  3:37 AM Charlie Ball Add [R71 8] Elevated red blood cell count       ED Disposition     ED Disposition Condition Date/Time Comment    Discharge Stable Sat Jul 25, 2020  3:36 AM Phil Campos discharge to home/self care              Follow-up Information     Follow up With Specialties Details Why Contact Info Additional Information    Deep Oshea MD Family Medicine Schedule an appointment as soon as possible for a visit   [address] [place] [address] [phone]        [facility] Emergency Medicine  If symptoms worsen [address] [address]  [phone] [address] ED, [address] [place] [address], [facility] [address]          Discharge Medication List as of 7/25/2020  3:37 AM      CONTINUE these medications which have NOT CHANGED    Details   AMLODIPINE BESYLATE PO Take 2 5 mg by mouth daily, Historical Med      hydrocortisone 2 5 % cream Apply 1 application topically 4 (four) times a day as needed, Historical Med      hydroxyurea (HYDREA) 500 mg capsule Take 500 mg by mouth daily, Historical Med      urea (CARMOL) 20 % cream Apply 1 application topically daily, Historical Med      vitamin B-12 (VITAMIN B-12) 1,000 mcg tablet Take 1,000 mcg by mouth daily, Historical Med      ammonium lactate (LAC-HYDRIN) 12 % cream Apply topically as needed for dry skin, Historical Med      aspirin 81 MG tablet Take 81 mg by mouth daily, Historical Med      Cetirizine HCl (ZYRTEC ALLERGY) 10 MG CAPS Take 10 mg by mouth daily , Historical Med      chlorhexidine (PERIDEX) 0 12 % solution Apply 15 mL to the mouth or throat 2 (two) times a day, Historical Med      clotrimazole 1 % external solution Apply 1 application topically 2 (two) times a day, Historical Med      fluticasone (FLOVENT DISKUS) 50 MCG/BLIST diskus inhaler Inhale 1 puff 2 (two) times a day, Historical Med      hydrOXYzine HCL (ATARAX) 10 mg tablet Take 30 mg by mouth daily as needed , Historical Med      ibuprofen (ADVIL,MOTRIN) 100 MG tablet Take 800 mg by mouth every 8 (eight) hours as needed for mild pain , Historical Med      levothyroxine 125 mcg tablet Take 112 mcg by mouth daily , Historical Med      loperamide (IMODIUM) 2 mg capsule Take 2 mg by mouth 2 (two) times a day as needed for diarrhea, Historical Med      multivitamin (THERAGRAN) TABS Take 1 tablet by mouth daily, Historical Med      Omega-3 Fatty Acids (FISH OIL) 1,000 mg Take 1,000 mg by mouth daily, Historical Med      oxybutynin (DITROPAN XL) 15 MG 24 hr tablet Take 1 tablet (15 mg total) by mouth daily, Starting Wed 5/13/2020, Print      ranitidine (ZANTAC) 150 mg tablet Take 150 mg by mouth daily as needed for heartburn, Historical Med      tamsulosin (FLOMAX) 0 4 mg Take 0 4 mg by mouth daily with dinner, Historical Med      tolnaftate (TINACTIN) 1 % powder Apply 1 application topically 2 (two) times a day, Historical Med      varenicline (CHANTIX) 1 mg tablet Take 1 mg by mouth 2 (two) times a day, Historical Med           No discharge procedures on file      PDMP Review       Value Time User    PDMP Reviewed  Yes 6/5/2020  9:24 PM Marisela Infante DO          ED Provider  Electronically Signed by           Ty Chavez MD  07/25/20 3932

## 2020-08-14 ENCOUNTER — TELEPHONE (OUTPATIENT)
Dept: UROLOGY | Facility: HOSPITAL | Age: 60
End: 2020-08-14

## 2020-08-14 NOTE — TELEPHONE ENCOUNTER
Called and spoke with pt  to confirm appt and PSA  He stated he will try to have PSA done  HE said this was a good number to call just ask for the PT  when we call for the Covid screening

## 2020-08-17 ENCOUNTER — APPOINTMENT (OUTPATIENT)
Dept: LAB | Facility: HOSPITAL | Age: 60
End: 2020-08-17
Payer: MEDICARE

## 2020-08-17 DIAGNOSIS — Z12.5 SCREENING FOR PROSTATE CANCER: ICD-10-CM

## 2020-08-17 LAB — PSA SERPL-MCNC: 0.4 NG/ML (ref 0–4)

## 2020-08-17 PROCEDURE — 36415 COLL VENOUS BLD VENIPUNCTURE: CPT

## 2020-08-17 PROCEDURE — G0103 PSA SCREENING: HCPCS

## 2020-08-18 NOTE — TELEPHONE ENCOUNTER
Patient's PSA was completed, but I was unable to do Covid screen, as his  is out of the office this week

## 2020-08-19 ENCOUNTER — OFFICE VISIT (OUTPATIENT)
Dept: UROLOGY | Facility: HOSPITAL | Age: 60
End: 2020-08-19
Payer: COMMERCIAL

## 2020-08-19 ENCOUNTER — TELEPHONE (OUTPATIENT)
Dept: UROLOGY | Facility: HOSPITAL | Age: 60
End: 2020-08-19

## 2020-08-19 VITALS
HEART RATE: 63 BPM | SYSTOLIC BLOOD PRESSURE: 118 MMHG | HEIGHT: 70 IN | WEIGHT: 161 LBS | BODY MASS INDEX: 23.05 KG/M2 | DIASTOLIC BLOOD PRESSURE: 70 MMHG

## 2020-08-19 DIAGNOSIS — R35.1 BENIGN PROSTATIC HYPERPLASIA WITH NOCTURIA: ICD-10-CM

## 2020-08-19 DIAGNOSIS — N40.1 BENIGN PROSTATIC HYPERPLASIA WITH NOCTURIA: ICD-10-CM

## 2020-08-19 DIAGNOSIS — Z12.5 SCREENING FOR PROSTATE CANCER: Primary | ICD-10-CM

## 2020-08-19 DIAGNOSIS — N32.81 OAB (OVERACTIVE BLADDER): ICD-10-CM

## 2020-08-19 LAB — POST-VOID RESIDUAL VOLUME, ML POC: 10 ML

## 2020-08-19 PROCEDURE — 51798 US URINE CAPACITY MEASURE: CPT | Performed by: NURSE PRACTITIONER

## 2020-08-19 PROCEDURE — 99214 OFFICE O/P EST MOD 30 MIN: CPT | Performed by: NURSE PRACTITIONER

## 2020-08-19 RX ORDER — TAMSULOSIN HYDROCHLORIDE 0.4 MG/1
0.8 CAPSULE ORAL
Qty: 90 CAPSULE | Refills: 3 | Status: SHIPPED | OUTPATIENT
Start: 2020-08-19 | End: 2020-08-19 | Stop reason: SDUPTHER

## 2020-08-19 RX ORDER — TAMSULOSIN HYDROCHLORIDE 0.4 MG/1
0.8 CAPSULE ORAL
Qty: 90 CAPSULE | Refills: 3 | Status: SHIPPED | OUTPATIENT
Start: 2020-08-19

## 2020-08-19 RX ORDER — OXYBUTYNIN CHLORIDE 15 MG/1
15 TABLET, EXTENDED RELEASE ORAL
Qty: 90 TABLET | Refills: 3 | Status: SHIPPED | OUTPATIENT
Start: 2020-08-19

## 2020-08-19 NOTE — PROGRESS NOTES
8/19/2020    Марина Campos  1960  [id number]      Assessment  -Prostate cancer screening  -Overactive bladder    Discussion/Plan  Ronal Vasques is a 63 y o  male being managed by Dr Gurinder Cerrato  1  Prostate cancer screening- we reviewed the results of his recent PSA which is 0 4  There were no abnormal findings noted on digital rectal examination today  Continue annual prostate cancer screening  2  Overactive bladder and BPH- PVR today is 10 mL  Due to his primary symptom of incomplete bladder emptying and straining, I recommend obtaining a renal ultrasound to ensure there is no anatomical cause for his symptoms  We will try increasing his tamsulosin dosage to 0 8 mg and he will continue to take oxybutynin 15 mg daily for nocturia  Reviewed dietary and behavior modifications  Follow-up in 6 weeks to re-evaluate his urinary, and perform PVR/Uroflow  Patient was instructed to call with any issues    -All questions answered, patient agrees with plan      History of Present Illness  63 y o  male with a history of prostate cancer screening and OAB presents today for follow up  Patient last seen via telemedicine visit in May  At that time, his oxybutynin dosage was increased due to increased episodes of nocturia  He remains on tamsulosin 0 4 mg HS and oxybutynin 15 mg daily  Patient remains unhappy with his urinary pattern  His primary symptom is straining with urination, hesitancy, and nocturia  He denies any episodes of gross hematuria or dysuria  Patient underwent cystoscopic evaluation in February 2020 which revealed moderate bilobar obstruction  No prior PSAs available in epic to review  He denies any strong family history of prostate cancer  Review of Systems  Review of Systems   Constitutional: Negative  HENT: Negative  Respiratory: Negative  Cardiovascular: Negative  Gastrointestinal: Negative  Genitourinary: Positive for frequency and urgency   Negative for decreased urine volume, difficulty urinating, dysuria, flank pain and hematuria  Musculoskeletal: Negative  Skin: Negative  Neurological: Negative  Psychiatric/Behavioral: Negative  AUA SYMPTOM SCORE      Most Recent Value   AUA SYMPTOM SCORE   How often have you had a sensation of not emptying your bladder completely after you finished urinating? 4   How often have you had to urinate again less than two hours after you finished urinating? 4   How often have you found you stopped and started again several times when you urinate? 4   How often have you found it difficult to postpone urination? 4   How often have you had a weak urinary stream?  4   How often have you had to push or strain to begin urination? 4   How many times did you most typically get up to urinate from the time you went to bed at night until the time you got up in the morning? 4   Quality of Life: If you were to spend the rest of your life with your urinary condition just the way it is now, how would you feel about that?  6   AUA SYMPTOM SCORE  28          Past Medical History  Past Medical History:   Diagnosis Date    Hypothyroid     [facility])        Past Social History  Past Surgical History:   Procedure Laterality Date    ABDOMINAL SURGERY      does not know what surgery was done       Past Family History  History reviewed  No pertinent family history      Past Social history  Social History     Socioeconomic History    Marital status: Single     Spouse name: Not on file    Number of children: Not on file    Years of education: Not on file    Highest education level: Not on file   Occupational History    Not on file   Social Needs    Financial resource strain: Not on file    Food insecurity     Worry: Not on file     Inability: Not on file    Transportation needs     Medical: Not on file     Non-medical: Not on file   Tobacco Use    Smoking status: Former Smoker    Smokeless tobacco: Never Used   Substance and Sexual Activity    Alcohol use: No    Drug use: No    Sexual activity: Not on file   Lifestyle    Physical activity     Days per week: Not on file     Minutes per session: Not on file    Stress: Not on file   Relationships    Social connections     Talks on phone: Not on file     Gets together: Not on file     Attends L[employer] service: Not on file     Active member of club or organization: Not on file     Attends meetings of clubs or organizations: Not on file     Relationship status: Not on file    Intimate partner violence     Fear of current or ex partner: Not on file     Emotionally abused: Not on file     Physically abused: Not on file     Forced sexual activity: Not on file   Other Topics Concern    Not on file   Social History Narrative    Not on file       Current Medications  Current Outpatient Medications   Medication Sig Dispense Refill    AMLODIPINE BESYLATE PO Take 2 5 mg by mouth daily      ammonium lactate (LAC-HYDRIN) 12 % cream Apply topically as needed for dry skin      aspirin 81 MG tablet Take 81 mg by mouth daily      Cetirizine HCl (ZYRTEC ALLERGY) 10 MG CAPS Take 10 mg by mouth daily       chlorhexidine (PERIDEX) 0 12 % solution Apply 15 mL to the mouth or throat 2 (two) times a day      clotrimazole 1 % external solution Apply 1 application topically 2 (two) times a day      fluticasone (FLOVENT DISKUS) 50 MCG/BLIST diskus inhaler Inhale 1 puff 2 (two) times a day      hydrocortisone 2 5 % cream Apply 1 application topically 4 (four) times a day as needed      hydroxyurea (HYDREA) 500 mg capsule Take 500 mg by mouth daily      hydrOXYzine HCL (ATARAX) 10 mg tablet Take 30 mg by mouth daily as needed       ibuprofen (ADVIL,MOTRIN) 100 MG tablet Take 800 mg by mouth every 8 (eight) hours as needed for mild pain       levothyroxine 125 mcg tablet Take 112 mcg by mouth daily       loperamide (IMODIUM) 2 mg capsule Take 2 mg by mouth 2 (two) times a day as needed for diarrhea      multivitamin (THERAGRAN) TABS Take 1 tablet by mouth daily      Omega-3 Fatty Acids (FISH OIL) 1,000 mg Take 1,000 mg by mouth daily      ranitidine (ZANTAC) 150 mg tablet Take 150 mg by mouth daily as needed for heartburn      tamsulosin (FLOMAX) 0 4 mg Take 2 capsules (0 8 mg total) by mouth daily with dinner 90 capsule 3    tolnaftate (TINACTIN) 1 % powder Apply 1 application topically 2 (two) times a day      urea (CARMOL) 20 % cream Apply 1 application topically daily      varenicline (CHANTIX) 1 mg tablet Take 1 mg by mouth 2 (two) times a day      vitamin B-12 (VITAMIN B-12) 1,000 mcg tablet Take 1,000 mcg by mouth daily      oxybutynin (DITROPAN XL) 15 MG 24 hr tablet Take 1 tablet (15 mg total) by mouth daily at bedtime 90 tablet 3     No current facility-administered medications for this visit  Allergies  Allergies   Allergen Reactions    Codeine      Pt does not know reaction       Past Medical History, Social History, Family History, medications and allergies were reviewed  Vitals  Vitals:    08/19/20 1316   BP: 118/70   BP Location: Left arm   Patient Position: Sitting   Cuff Size: Adult   Pulse: 63   Weight: 73 kg (161 lb)   Height: 5' 10" (1 778 m)       Physical Exam  Physical Exam  Constitutional:       Appearance: Normal appearance  He is well-developed  HENT:      Head: Normocephalic  Eyes:      Pupils: Pupils are equal, round, and reactive to light  Neck:      Musculoskeletal: Normal range of motion  Pulmonary:      Effort: Pulmonary effort is normal    Abdominal:      Palpations: Abdomen is soft  Genitourinary:     Prostate: Normal       Rectum: Normal    Musculoskeletal: Normal range of motion  Skin:     General: Skin is warm and dry  Neurological:      General: No focal deficit present  Mental Status: He is alert and oriented to person, place, and time     Psychiatric:         Mood and Affect: Mood normal          Behavior: Behavior normal  Thought Content: Thought content normal          Judgment: Judgment normal          Results    I have personally reviewed all pertinent lab results and reviewed with patient  Lab Results   Component Value Date    PSA 0 4 08/17/2020     Lab Results   Component Value Date    CALCIUM 9 0 07/25/2020    K 4 0 07/25/2020    CO2 26 07/25/2020     07/25/2020    BUN 13 07/25/2020    CREATININE 1 13 07/25/2020     Lab Results   Component Value Date    WBC 13 38 (H) 07/25/2020    HGB 16 6 07/25/2020    HCT 52 8 (H) 07/25/2020    MCV 61 (L) 07/25/2020     07/25/2020     No results found for this or any previous visit (from the past 1 hour(s))

## 2020-08-25 ENCOUNTER — HOSPITAL ENCOUNTER (OUTPATIENT)
Dept: ULTRASOUND IMAGING | Facility: HOSPITAL | Age: 60
Discharge: HOME | End: 2020-08-25
Payer: COMMERCIAL

## 2020-08-25 DIAGNOSIS — N40.1 BENIGN PROSTATIC HYPERPLASIA WITH NOCTURIA: ICD-10-CM

## 2020-08-25 DIAGNOSIS — R35.1 BENIGN PROSTATIC HYPERPLASIA WITH NOCTURIA: ICD-10-CM

## 2020-08-25 PROCEDURE — 51798 US URINE CAPACITY MEASURE: CPT

## 2022-01-11 ENCOUNTER — OFFICE VISIT (OUTPATIENT)
Dept: URGENT CARE | Facility: CLINIC | Age: 62
End: 2022-01-11
Payer: COMMERCIAL

## 2022-01-11 ENCOUNTER — APPOINTMENT (OUTPATIENT)
Dept: RADIOLOGY | Facility: CLINIC | Age: 62
End: 2022-01-11
Payer: COMMERCIAL

## 2022-01-11 VITALS
WEIGHT: 166.4 LBS | TEMPERATURE: 97.8 F | HEART RATE: 62 BPM | RESPIRATION RATE: 16 BRPM | DIASTOLIC BLOOD PRESSURE: 84 MMHG | BODY MASS INDEX: 23.88 KG/M2 | OXYGEN SATURATION: 97 % | SYSTOLIC BLOOD PRESSURE: 131 MMHG

## 2022-01-11 DIAGNOSIS — S92.354A CLOSED NONDISPLACED FRACTURE OF FIFTH METATARSAL BONE OF RIGHT FOOT, INITIAL ENCOUNTER: Primary | ICD-10-CM

## 2022-01-11 DIAGNOSIS — S99.911A RIGHT ANKLE INJURY, INITIAL ENCOUNTER: ICD-10-CM

## 2022-01-11 DIAGNOSIS — S99.921A RIGHT FOOT INJURY, INITIAL ENCOUNTER: ICD-10-CM

## 2022-01-11 PROCEDURE — 73630 X-RAY EXAM OF FOOT: CPT

## 2022-01-11 PROCEDURE — G0382 LEV 3 HOSP TYPE B ED VISIT: HCPCS | Performed by: PREVENTIVE MEDICINE

## 2022-01-11 PROCEDURE — 73610 X-RAY EXAM OF ANKLE: CPT

## 2022-01-11 NOTE — PROGRESS NOTES
[facility]        NAME: Miles Serrano is a 57 y o  male  : 1960    MRN: [id number]  DATE: 2022  TIME: 2:31 PM    Assessment and Plan   Closed nondisplaced fracture of fifth metatarsal bone of right foot, initial encounter [S93 354A]  1  Closed nondisplaced fracture of fifth metatarsal bone of right foot, initial encounter     2  Right foot injury, initial encounter  XR foot 3+ vw right   3  Right ankle injury, initial encounter  XR ankle 3+ vw right         Patient Instructions       Follow up with PCP in 3-5 days  Proceed to  ER if symptoms worsen  Chief Complaint     Chief Complaint   Patient presents with    Ankle Injury     Pt slipped on ice this morning  Pt c/o 9/10 ankle pain entire circumference  Pt c/o R foot pain  History of Present Illness       He fell on the ice  Now has pain over the right lateral foot  Review of Systems   Review of Systems   Musculoskeletal: Positive for arthralgias, gait problem and joint swelling           Current Medications       Current Outpatient Medications:     AMLODIPINE BESYLATE PO, Take 2 5 mg by mouth daily, Disp: , Rfl:     ammonium lactate (LAC-HYDRIN) 12 % cream, Apply topically as needed for dry skin, Disp: , Rfl:     aspirin 81 MG tablet, Take 81 mg by mouth daily, Disp: , Rfl:     Cetirizine HCl (ZYRTEC ALLERGY) 10 MG CAPS, Take 10 mg by mouth daily , Disp: , Rfl:     chlorhexidine (PERIDEX) 0 12 % solution, Apply 15 mL to the mouth or throat 2 (two) times a day, Disp: , Rfl:     clotrimazole 1 % external solution, Apply 1 application topically 2 (two) times a day, Disp: , Rfl:     fluticasone (FLOVENT DISKUS) 50 MCG/BLIST diskus inhaler, Inhale 1 puff 2 (two) times a day, Disp: , Rfl:     hydrocortisone 2 5 % cream, Apply 1 application topically 4 (four) times a day as needed, Disp: , Rfl:     hydroxyurea (HYDREA) 500 mg capsule, Take 500 mg by mouth daily, Disp: , Rfl:     hydrOXYzine HCL (ATARAX) 10 mg tablet, Take 30 mg by mouth daily as needed , Disp: , Rfl:     ibuprofen (ADVIL,MOTRIN) 100 MG tablet, Take 800 mg by mouth every 8 (eight) hours as needed for mild pain , Disp: , Rfl:     levothyroxine 125 mcg tablet, Take 112 mcg by mouth daily , Disp: , Rfl:     loperamide (IMODIUM) 2 mg capsule, Take 2 mg by mouth 2 (two) times a day as needed for diarrhea, Disp: , Rfl:     multivitamin (THERAGRAN) TABS, Take 1 tablet by mouth daily, Disp: , Rfl:     Omega-3 Fatty Acids (FISH OIL) 1,000 mg, Take 1,000 mg by mouth daily, Disp: , Rfl:     oxybutynin (DITROPAN XL) 15 MG 24 hr tablet, Take 1 tablet (15 mg total) by mouth daily at bedtime, Disp: 90 tablet, Rfl: 3    ranitidine (ZANTAC) 150 mg tablet, Take 150 mg by mouth daily as needed for heartburn, Disp: , Rfl:     tamsulosin (FLOMAX) 0 4 mg, Take 2 capsules (0 8 mg total) by mouth daily with dinner, Disp: 90 capsule, Rfl: 3    tolnaftate (TINACTIN) 1 % powder, Apply 1 application topically 2 (two) times a day, Disp: , Rfl:     urea (CARMOL) 20 % cream, Apply 1 application topically daily, Disp: , Rfl:     varenicline (CHANTIX) 1 mg tablet, Take 1 mg by mouth 2 (two) times a day, Disp: , Rfl:     vitamin B-12 (VITAMIN B-12) 1,000 mcg tablet, Take 1,000 mcg by mouth daily, Disp: , Rfl:     Current Allergies     Allergies as of 01/11/2022 - Reviewed 01/11/2022   Allergen Reaction Noted    Codeine  02/20/2017            The following portions of the patient's history were reviewed and updated as appropriate: allergies, current medications, past family history, past medical history, past social history, past surgical history and problem list      Past Medical History:   Diagnosis Date    Hypothyroid     [facility])        Past Surgical History:   Procedure Laterality Date    ABDOMINAL SURGERY      does not know what surgery was done       No family history on file  Medications have been verified          Objective   /84 Pulse 62   Temp 97 8 °F (36 6 °C)   Resp 16   Wt 75 5 kg (166 lb 6 4 oz)   SpO2 97%   BMI 23 88 kg/m²   No LMP for male patient         Physical Exam     Physical Exam  Musculoskeletal:      Comments: Pain to palpation over the proximal end of the 5th metatarsal on the right     X-ray shows fracture proximal metatarsal

## 2024-01-14 ENCOUNTER — OFFICE VISIT (OUTPATIENT)
Dept: URGENT CARE | Facility: CLINIC | Age: 64
End: 2024-01-14
Payer: MEDICARE

## 2024-01-14 VITALS — OXYGEN SATURATION: 99 % | RESPIRATION RATE: 18 BRPM | HEART RATE: 46 BPM | TEMPERATURE: 97 F

## 2024-01-14 DIAGNOSIS — B00.2 ORAL HERPES SIMPLEX INFECTION: Primary | ICD-10-CM

## 2024-01-14 PROCEDURE — 99213 OFFICE O/P EST LOW 20 MIN: CPT | Performed by: FAMILY MEDICINE

## 2024-01-14 PROCEDURE — G0463 HOSPITAL OUTPT CLINIC VISIT: HCPCS | Performed by: FAMILY MEDICINE

## 2024-01-14 RX ORDER — VALACYCLOVIR HYDROCHLORIDE 1 G/1
1000 TABLET, FILM COATED ORAL 2 TIMES DAILY
Qty: 14 TABLET | Refills: 0 | Status: SHIPPED | OUTPATIENT
Start: 2024-01-14 | End: 2024-01-21

## 2024-01-14 NOTE — PROGRESS NOTES
[facility]        NAME: Giovanny Campos is a 63 y.o. male  : 1960    MRN: [id number]  DATE: 2024  TIME: 12:15 PM    Assessment and Plan   Oral herpes simplex infection [B00.2]  1. Oral herpes simplex infection  valACYclovir (VALTREX) 1,000 mg tablet            Patient Instructions       Follow up with PCP in 3-5 days.  Proceed to  ER if symptoms worsen.    Chief Complaint     Chief Complaint   Patient presents with    Possible Cold Sore/Herpes Outbreak     Pt reports cold sore/herpes breakout appeared about 2-3 days.          History of Present Illness       63-year-old male presenting with tingling and itchiness rash along his left lower lip 3-4 days ago.        Review of Systems   Review of Systems   Constitutional: Negative.    Eyes: Negative.    Respiratory: Negative.     Cardiovascular: Negative.    Gastrointestinal: Negative.    Genitourinary: Negative.    Skin:  Positive for rash.   Allergic/Immunologic: Negative.    Neurological: Negative.    Hematological: Negative.    Psychiatric/Behavioral: Negative.           Current Medications       Current Outpatient Medications:     AMLODIPINE BESYLATE PO, Take 2.5 mg by mouth daily, Disp: , Rfl:     aspirin 81 MG tablet, Take 81 mg by mouth daily, Disp: , Rfl:     Cetirizine HCl (ZYRTEC ALLERGY) 10 MG CAPS, Take 10 mg by mouth daily , Disp: , Rfl:     hydroxyurea (HYDREA) 500 mg capsule, Take 500 mg by mouth daily, Disp: , Rfl:     hydrOXYzine HCL (ATARAX) 10 mg tablet, Take 30 mg by mouth daily as needed , Disp: , Rfl:     levothyroxine 125 mcg tablet, Take 112 mcg by mouth daily , Disp: , Rfl:     loperamide (IMODIUM) 2 mg capsule, Take 2 mg by mouth 2 (two) times a day as needed for diarrhea, Disp: , Rfl:     multivitamin (THERAGRAN) TABS, Take 1 tablet by mouth daily, Disp: , Rfl:     Omega-3 Fatty Acids (FISH OIL) 1,000 mg, Take 1,000 mg by mouth daily, Disp: , Rfl:     oxybutynin (DITROPAN XL) 15 MG 24 hr tablet, Take 1 tablet  (15 mg total) by mouth daily at bedtime, Disp: 90 tablet, Rfl: 3    ranitidine (ZANTAC) 150 mg tablet, Take 150 mg by mouth daily as needed for heartburn, Disp: , Rfl:     tamsulosin (FLOMAX) 0.4 mg, Take 2 capsules (0.8 mg total) by mouth daily with dinner, Disp: 90 capsule, Rfl: 3    urea (CARMOL) 20 % cream, Apply 1 application topically daily, Disp: , Rfl:     valACYclovir (VALTREX) 1,000 mg tablet, Take 1 tablet (1,000 mg total) by mouth 2 (two) times a day for 7 days, Disp: 14 tablet, Rfl: 0    vitamin B-12 (VITAMIN B-12) 1,000 mcg tablet, Take 1,000 mcg by mouth daily, Disp: , Rfl:     ammonium lactate (LAC-HYDRIN) 12 % cream, Apply topically as needed for dry skin, Disp: , Rfl:     chlorhexidine (PERIDEX) 0.12 % solution, Apply 15 mL to the mouth or throat 2 (two) times a day, Disp: , Rfl:     clotrimazole 1 % external solution, Apply 1 application topically 2 (two) times a day, Disp: , Rfl:     fluticasone (FLOVENT DISKUS) 50 MCG/BLIST diskus inhaler, Inhale 1 puff 2 (two) times a day (Patient not taking: Reported on 1/14/2024), Disp: , Rfl:     hydrocortisone 2.5 % cream, Apply 1 application topically 4 (four) times a day as needed, Disp: , Rfl:     ibuprofen (ADVIL,MOTRIN) 100 MG tablet, Take 800 mg by mouth every 8 (eight) hours as needed for mild pain , Disp: , Rfl:     tolnaftate (TINACTIN) 1 % powder, Apply 1 application topically 2 (two) times a day, Disp: , Rfl:     varenicline (CHANTIX) 1 mg tablet, Take 1 mg by mouth 2 (two) times a day (Patient not taking: Reported on 1/14/2024), Disp: , Rfl:     Current Allergies     Allergies as of 01/14/2024 - Reviewed 01/14/2024   Allergen Reaction Noted    Codeine  02/20/2017            The following portions of the patient's history were reviewed and updated as appropriate: allergies, current medications, past family history, past medical history, past social history, past surgical history and problem list.     Past Medical History:   Diagnosis Date     Hypothyroid     Traumatic brain injury (HCC)        Past Surgical History:   Procedure Laterality Date    ABDOMINAL SURGERY      does not know what surgery was done       No family history on file.      Medications have been verified.        Objective   Pulse (!) 46   Temp (!) 97 °F (36.1 °C) (Temporal)   Resp 18   SpO2 99%   No LMP for male patient.       Physical Exam     Physical Exam  Constitutional:       Appearance: He is well-developed.   Eyes:      Pupils: Pupils are equal, round, and reactive to light.   Cardiovascular:      Rate and Rhythm: Normal rate.   Pulmonary:      Effort: Pulmonary effort is normal.   Musculoskeletal:         General: Normal range of motion.      Cervical back: Normal range of motion.   Skin:     General: Skin is warm.      Findings: Lesion present.   Neurological:      Mental Status: He is alert.

## 2024-09-11 DIAGNOSIS — N32.81 OAB (OVERACTIVE BLADDER): ICD-10-CM

## 2024-09-11 RX ORDER — OXYBUTYNIN CHLORIDE 15 MG/1
15 TABLET, EXTENDED RELEASE ORAL
Qty: 28 TABLET | Refills: 3 | OUTPATIENT
Start: 2024-09-11

## 2024-09-12 NOTE — TELEPHONE ENCOUNTER
Spoke to , he will c/b to let us know if pt wants follow-up with our office or to just have it done at the facility he is at.

## 2025-01-21 DIAGNOSIS — N32.81 OAB (OVERACTIVE BLADDER): ICD-10-CM

## 2025-01-21 NOTE — TELEPHONE ENCOUNTER
[facility] -   [address] [phone]       oxybutynin (DITROPAN XL) 15 MG 24 hr tablet

## 2025-01-22 RX ORDER — OXYBUTYNIN CHLORIDE 15 MG/1
15 TABLET, EXTENDED RELEASE ORAL
Qty: 90 TABLET | Refills: 3 | OUTPATIENT
Start: 2025-01-22

## 2025-05-05 DIAGNOSIS — N32.81 OAB (OVERACTIVE BLADDER): ICD-10-CM

## 2025-05-06 RX ORDER — OXYBUTYNIN CHLORIDE 15 MG/1
15 TABLET, EXTENDED RELEASE ORAL
Qty: 90 TABLET | Refills: 3 | OUTPATIENT
Start: 2025-05-06

## 2025-05-06 NOTE — TELEPHONE ENCOUNTER
Called and pt is in a facility which is the phone number in the chart ([facility]) so the nurses there told me to disregard this request.

## 2025-05-06 NOTE — TELEPHONE ENCOUNTER
Refill must be reviewed and completed by the office or provider. The refill is unable to be approved or denied by the medication management team.    Patient not seen > 4 year

## 2025-05-06 NOTE — TELEPHONE ENCOUNTER
Patient has not been seen by our team in over 3 years, due to this he is considered a new patient and we are not able to send him further medications.  Moving forward, he can either get his medication through his primary care physician, or he can reschedule an appointment with us so we are able to resend his prescription.

## 2025-06-13 ENCOUNTER — TELEPHONE (OUTPATIENT)
Age: 65
End: 2025-06-13

## 2025-06-13 NOTE — TELEPHONE ENCOUNTER
New Patient      Insurance   Current Insurance?yes   Insurance E-verified?  yes    History   Reason for appointment/active symptoms?  reestablish care -medication      Has the patient had any previous Urologist(s)? [email or web address]     Was the patient seen in the ED? no     Labs/Imaging(Including Out Of Network)?      Records Requested?   Records Visible in EPIC?      Personal history of cancer?      Appointment   Office location preference:  [facility]   ?   Appointment Details   Date:  7/25/  Time:  10  Location: [place]   Provider:aamir    Does the appointment need further review?
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
## 2025-07-25 ENCOUNTER — OFFICE VISIT (OUTPATIENT)
Dept: UROLOGY | Facility: HOSPITAL | Age: 65
End: 2025-07-25
Payer: MEDICARE

## 2025-07-25 VITALS
HEART RATE: 60 BPM | BODY MASS INDEX: 23.42 KG/M2 | SYSTOLIC BLOOD PRESSURE: 148 MMHG | WEIGHT: 163.6 LBS | HEIGHT: 70 IN | OXYGEN SATURATION: 98 % | DIASTOLIC BLOOD PRESSURE: 78 MMHG

## 2025-07-25 DIAGNOSIS — Z12.5 SCREENING FOR PROSTATE CANCER: ICD-10-CM

## 2025-07-25 DIAGNOSIS — N32.81 OVERACTIVE BLADDER: Primary | ICD-10-CM

## 2025-07-25 LAB — POST-VOID RESIDUAL VOLUME, ML POC: 181 ML

## 2025-07-25 PROCEDURE — 51798 US URINE CAPACITY MEASURE: CPT

## 2025-07-25 PROCEDURE — 99204 OFFICE O/P NEW MOD 45 MIN: CPT

## 2025-07-25 RX ORDER — TADALAFIL 5 MG/1
5 TABLET ORAL DAILY PRN
Qty: 90 TABLET | Refills: 3 | Status: SHIPPED | OUTPATIENT
Start: 2025-07-25

## 2025-07-25 NOTE — ASSESSMENT & PLAN NOTE
He reports difficulty voiding, urinary hesitancy, and frequency about 10x per day  He feels that he voids frequently d/t the large amount of water he drinks  He feels that often when he goes to the bathroom even after drinking tons of water, he has a hard time getting started and often feels like he doesn't empty his bladder   S/p cystoscopy and urodynamics in 2020 by Dr. Yao-- moderate bilobar obstruction; deemed to have an overactive bladder   Has been managed on tamsulosin 0.8mg and oxybutynin 15mg nightly   Unable to provide urine sample in the office today   PVR 181mls  Recommend discontinuing oxybutynin and starting cialis 5mg   Discussed repeat cystoscopy if symptoms persist/worsen to evaluate for bladder outlet obstruction  Follow up in 6 months   Orders:    POCT Measure PVR    tadalafil (CIALIS) 5 MG tablet; Take 1 tablet (5 mg total) by mouth daily as needed for erectile dysfunction

## 2025-07-25 NOTE — ASSESSMENT & PLAN NOTE
Last PSA was 0.4 on 8/17/2020  MAG today palpably benign   Updated order placed for PSA   Orders:    POCT Measure PVR    PSA, Total Screen; Future

## 2025-07-28 RX ORDER — TADALAFIL 5 MG/1
5 TABLET ORAL DAILY
Qty: 90 TABLET | Refills: 3 | Status: SHIPPED | OUTPATIENT
Start: 2025-07-28